# Patient Record
Sex: FEMALE | Race: WHITE
[De-identification: names, ages, dates, MRNs, and addresses within clinical notes are randomized per-mention and may not be internally consistent; named-entity substitution may affect disease eponyms.]

---

## 2017-04-20 ENCOUNTER — HOSPITAL ENCOUNTER (OUTPATIENT)
Dept: HOSPITAL 84 - D.MAMMO | Age: 57
Discharge: HOME | End: 2017-04-20
Attending: FAMILY MEDICINE
Payer: COMMERCIAL

## 2017-04-20 VITALS — BODY MASS INDEX: 27.7 KG/M2

## 2017-04-20 DIAGNOSIS — Z12.31: Primary | ICD-10-CM

## 2019-08-08 ENCOUNTER — HOSPITAL ENCOUNTER (OUTPATIENT)
Dept: HOSPITAL 84 - D.MAMMO | Age: 59
Discharge: HOME | End: 2019-08-08
Attending: FAMILY MEDICINE
Payer: COMMERCIAL

## 2019-08-08 VITALS — BODY MASS INDEX: 27.7 KG/M2

## 2019-08-08 DIAGNOSIS — Z12.31: Primary | ICD-10-CM

## 2020-08-10 ENCOUNTER — HOSPITAL ENCOUNTER (OUTPATIENT)
Dept: HOSPITAL 84 - D.MAMMO | Age: 60
Discharge: HOME | End: 2020-08-10
Attending: FAMILY MEDICINE
Payer: COMMERCIAL

## 2020-08-10 VITALS — BODY MASS INDEX: 27.7 KG/M2

## 2020-08-10 DIAGNOSIS — Z12.31: Primary | ICD-10-CM

## 2020-10-20 ENCOUNTER — HOSPITAL ENCOUNTER (OUTPATIENT)
Dept: HOSPITAL 84 - D.OPS | Age: 60
Discharge: HOME | End: 2020-10-20
Attending: SURGERY
Payer: COMMERCIAL

## 2020-10-20 VITALS — HEIGHT: 64 IN | WEIGHT: 165.35 LBS | BODY MASS INDEX: 28.23 KG/M2 | BODY MASS INDEX: 28.23 KG/M2

## 2020-10-20 VITALS — DIASTOLIC BLOOD PRESSURE: 68 MMHG | SYSTOLIC BLOOD PRESSURE: 136 MMHG

## 2020-10-20 DIAGNOSIS — Z80.0: ICD-10-CM

## 2020-10-20 DIAGNOSIS — R13.19: ICD-10-CM

## 2020-10-20 DIAGNOSIS — K22.70: ICD-10-CM

## 2020-10-20 DIAGNOSIS — Z86.010: Primary | ICD-10-CM

## 2020-10-20 DIAGNOSIS — K63.5: ICD-10-CM

## 2020-10-20 DIAGNOSIS — K21.9: ICD-10-CM

## 2020-10-20 LAB
ANION GAP SERPL CALC-SCNC: 13.4 MMOL/L (ref 8–16)
BASOPHILS NFR BLD AUTO: 0.3 % (ref 0–2)
BUN SERPL-MCNC: 11 MG/DL (ref 7–18)
CALCIUM SERPL-MCNC: 9.3 MG/DL (ref 8.5–10.1)
CHLORIDE SERPL-SCNC: 107 MMOL/L (ref 98–107)
CO2 SERPL-SCNC: 26.8 MMOL/L (ref 21–32)
CREAT SERPL-MCNC: 0.7 MG/DL (ref 0.6–1.3)
EOSINOPHIL NFR BLD: 1.9 % (ref 0–7)
ERYTHROCYTE [DISTWIDTH] IN BLOOD BY AUTOMATED COUNT: 12.8 % (ref 11.5–14.5)
GLUCOSE SERPL-MCNC: 96 MG/DL (ref 74–106)
HCT VFR BLD CALC: 38.5 % (ref 36–48)
HGB BLD-MCNC: 12.6 G/DL (ref 12–16)
IMM GRANULOCYTES NFR BLD: 0.1 % (ref 0–5)
LYMPHOCYTES NFR BLD AUTO: 46.3 % (ref 15–50)
MCH RBC QN AUTO: 30.9 PG (ref 26–34)
MCHC RBC AUTO-ENTMCNC: 32.7 G/DL (ref 31–37)
MCV RBC: 94.4 FL (ref 80–100)
MONOCYTES NFR BLD: 10.3 % (ref 2–11)
NEUTROPHILS NFR BLD AUTO: 41.1 % (ref 40–80)
OSMOLALITY SERPL CALC.SUM OF ELEC: 283 MOSM/KG (ref 275–300)
PLATELET # BLD: 287 10X3/UL (ref 130–400)
PMV BLD AUTO: 9.3 FL (ref 7.4–10.4)
POTASSIUM SERPL-SCNC: 4.2 MMOL/L (ref 3.5–5.1)
RBC # BLD AUTO: 4.08 10X6/UL (ref 4–5.4)
SODIUM SERPL-SCNC: 143 MMOL/L (ref 136–145)
WBC # BLD AUTO: 7.5 10X3/UL (ref 4.8–10.8)

## 2020-10-20 NOTE — NUR
1443 DRESSED, AWAKE & ALERT.  GIVEN D/C INFORMATION INCLUDING:  MED
REC, SHEET LISTING NSAIDS TO AVOID & [facility] POST ENDOSCOPY D/C
INSTRUCTIONS.  PT VOICED UNDERSTANDING.  TO PRIVATE CAR PER STAFF.
HOME WITH FRIEND.  ELHAM GARCIA R.N.

## 2020-10-20 NOTE — HP
PATIENT: ASHELY HALLMAN                                   MEDICAL RECORD: [id number]
ACCOUNT: [id number]                                    LOCATION:D.OPS         
: [birth year]                                            ADMISSION DATE: 10/20/20
                                                         PCP: CRISTIANE CROWDER DO     
 
                             HISTORY AND PHYSICAL EXAMINATION
 
 
CHIEF COMPLAINT:  Here for endoscopy.
 
HISTORY OF PRESENT ILLNESS:  The patient has a history of colon polyps.  Also,
history of dysphagia.  She has had recurrent dysphagia as well as odynophagia. 
Back in , she had what I felt was a worrisome polyp that was a 1.2 cm polyp
near the anorectal junction.  Also, history of Camacho's esophagus in need of
surveillance.  She has noted no rectal bleeding.
 
She does have a family history of colon cancer and that 2 brothers had colon
cancer.
 
PAST MEDICAL AND SURGICAL HISTORY:  Hypertension, hypothyroidism on replacement
therapy.  Recurrent dysphagia, history of EGDs and colonoscopies, history of
breast reduction, history of orthopedic surgery to the right lower extremity,
history of hysterectomy, history of laparoscopic cholecystectomy.
 
PHYSICAL EXAMINATION:
GENERAL:  The patient does not appear acutely ill.  She does not appear
chronically ill.
VITAL SIGNS:  Reviewed.
EARS:  External ears appear normal.
EYES:  Extraocular movements are intact.
NECK:  Trachea is midline.
CHEST:  No intercostal retractions.
PULMONARY:  Nonlabored.  No stridor.
 
MEDICATIONS:  Please see the nursing list.
 
ALLERGIES:  PENICILLIN, ISONIAZID AS WELL AS RIFAMPIN.
 
SOCIAL HISTORY:  She is a smoker.
 
IMPRESSION:
1.  Camacho's esophagus.
2.  Recurrent dysphagia and odynophagia.
3.  Family history of colon cancer
4.  History of colon polyps in need of surveillance colonoscopy.
 
PLAN:  EGD, esophageal dilation, colonoscopy.
 
TRANSINT:[id number] Voice Confirmation ID: [id number] DOCUMENT ID: [id number]
 
 
 
 
 
HISTORY AND PHYSICAL                           [id number]    ASHELY HALLMAN MYLENE SANCHEZ MD            
 
 
 
Electronically Signed by MYLENE LEON on 10/20/20 at 1713
 
 
 
 
 
 
 
 
 
 
 
 
 
 
 
 
 
 
 
 
 
 
 
 
 
 
 
 
 
 
 
 
 
 
 
 
 
 
 
 
 
CC: CRISTIANE CROWDER DO                                          [id number]
DICTATION DATE: 10/20/20 1246     :     10/20/20 1521      [facility] 
                                                                      10/20/20
[facility]                                          
[address] [place] [address]

## 2020-10-21 NOTE — OP
PATIENT NAME:  ASHELY HALLMAN                             MEDICAL RECORD: [id number]
:[birth year]                                             LOCATION:D.OPS          
                                                         ADMISSION DATE:        
SURGEON:  KIRAN LEON MD            
 
 
DATE OF OPERATION:  10/20/2020
 
PRINCIPAL DIAGNOSES:
1.  History of colon polyps.
2.  Gastroesophageal reflux disease.
3.  Recurrent dysphagia and odynophagia.
4.  Camacho's esophagus.
5.  Family history of colon cancer.
 
POSTOPERATIVE DIAGNOSES:
1.  History of colon polyps.
2.  Gastroesophageal reflux disease.
3.  Recurrent dysphagia and odynophagia.
4.  Camacho's esophagus.
5.  Family history of colon cancer.
6.  No evidence of persistent or recurrent Camacho's.
7.  Three polyps; 1 was an anorectal polyp next to a scar and this is a
recurrent polyp, another one is a questionable polyp, it was sessile and was a
1.2 cm polyp, and the third was a polyp and it was a 5 mm polyp.
 
PROCEDURES:
1.  Esophagogastroduodenoscopy with antral biopsies.
2.  Esophageal dilation to 60-Cambodian through the catheter balloon.
3.  Total colonoscopy to cecum.
4.  Hot biopsy forceps polypectomies times 3.
 
SURGEON:  Kiran Leon MD
 
ASSISTANT:  None.
 
BLOOD LOSS:  Minimal.
 
ANESTHESIA:  IV sedation.
 
COMPLICATIONS:  None.
 
The risks and possible complications and alternatives of the procedure were
explained to the patient.  She elected to proceed.
 
ENDOSCOPIC COURSE:  The patient was conveyed to the endoscopy suite electively
on 10/20/2020.  IV sedation was induced by the anesthesia staff.  A bite block
was placed.  Gastroscope was advanced into the esophagus.  It was then advanced
through the stomach into the duodenum.  Upon withdrawal, retroflexed and angulus
views were obtained.  Antral biopsies were obtained to rule out H. pylori.  I
then withdrew into the cardia of the stomach.  I advanced through the catheter
balloon.  I then sequentially dilated the entire length of the esophagus to
60-Cambodian.  The gastroscope was then readvanced.  There had been no evidence of
false passage or perforation.
 
The patient was turned 180 degrees and placed in the Hernandez position.  A digital
rectal examination was performed.  A colonoscope was inserted through the anus. 
It was easily advanced to the cecum.  The prep was adequate.  I slowly withdrew
 
 
 
OPERATIVE REPORT                               [id number]    ASHELY HALLMAN           
 
 
the endoscope.  I dragged the folds.  The pullback was greater than 20-minute
pullback.  A retroflexed view was obtained in the rectum.  This is when I saw
the scar from the prior polypectomy as well as the recurrent polyp right next to
it.  This polyp was removed.  I then unretroflexed the scope and removed it
under direct vision.
 
I will plan for her next colonoscopy to take place in 3 years.
 
TRANSINT:[id number] Voice Confirmation ID: [id number] DOCUMENT ID: [id number]
                                           
                                           KIRAN LEON MD            
 
 
 
Electronically Signed by KIRAN LEON on 10/21/20 at 1610
 
 
 
 
 
 
 
 
 
 
 
 
 
 
 
 
 
 
 
 
 
 
 
 
 
 
 
 
 
 
 
 
CC: CRISTIANE CROWDER DO                                          [id number]
DICTATION DATE: 10/20/20 1402     :     10/20/20 2336      [facility] 
                                                                      10/20/20
[facility]                                          
[address] [place] [address]